# Patient Record
Sex: FEMALE | Race: WHITE | NOT HISPANIC OR LATINO | Employment: OTHER | URBAN - METROPOLITAN AREA
[De-identification: names, ages, dates, MRNs, and addresses within clinical notes are randomized per-mention and may not be internally consistent; named-entity substitution may affect disease eponyms.]

---

## 2022-04-12 ENCOUNTER — HISTORICAL (OUTPATIENT)
Dept: ADMINISTRATIVE | Facility: HOSPITAL | Age: 67
End: 2022-04-12

## 2022-04-30 VITALS
BODY MASS INDEX: 22.85 KG/M2 | WEIGHT: 142.19 LBS | OXYGEN SATURATION: 97 % | SYSTOLIC BLOOD PRESSURE: 133 MMHG | DIASTOLIC BLOOD PRESSURE: 88 MMHG | HEIGHT: 66 IN

## 2024-03-14 DIAGNOSIS — R25.3 FASCICULATIONS: ICD-10-CM

## 2024-03-14 DIAGNOSIS — R51.9 HEADACHE: Primary | ICD-10-CM

## 2024-07-29 ENCOUNTER — OFFICE VISIT (OUTPATIENT)
Dept: NEUROLOGY | Facility: CLINIC | Age: 69
End: 2024-07-29
Payer: MEDICARE

## 2024-07-29 VITALS
HEIGHT: 65 IN | SYSTOLIC BLOOD PRESSURE: 132 MMHG | BODY MASS INDEX: 22.82 KG/M2 | WEIGHT: 137 LBS | DIASTOLIC BLOOD PRESSURE: 72 MMHG

## 2024-07-29 DIAGNOSIS — R51.9 HEADACHE: ICD-10-CM

## 2024-07-29 DIAGNOSIS — R25.3 FASCICULATIONS: ICD-10-CM

## 2024-07-29 DIAGNOSIS — G51.32 HEMIFACIAL SPASM OF LEFT SIDE OF FACE: Primary | ICD-10-CM

## 2024-07-29 PROCEDURE — 99204 OFFICE O/P NEW MOD 45 MIN: CPT | Mod: S$PBB,,, | Performed by: SPECIALIST

## 2024-07-29 PROCEDURE — 99213 OFFICE O/P EST LOW 20 MIN: CPT | Mod: PBBFAC | Performed by: SPECIALIST

## 2024-07-29 PROCEDURE — 99999 PR PBB SHADOW E&M-EST. PATIENT-LVL III: CPT | Mod: PBBFAC,,, | Performed by: SPECIALIST

## 2024-07-29 RX ORDER — MELOXICAM 15 MG/1
15 TABLET ORAL DAILY PRN
COMMUNITY

## 2024-07-29 RX ORDER — GABAPENTIN 300 MG/1
300 CAPSULE ORAL
COMMUNITY

## 2024-07-29 RX ORDER — OMEPRAZOLE 40 MG/1
40 CAPSULE, DELAYED RELEASE ORAL
COMMUNITY
Start: 2024-06-09

## 2024-07-29 RX ORDER — CITALOPRAM 20 MG/1
TABLET, FILM COATED ORAL
COMMUNITY
Start: 2024-05-18

## 2024-07-29 RX ORDER — ATORVASTATIN CALCIUM 20 MG/1
TABLET, FILM COATED ORAL
COMMUNITY

## 2024-07-29 RX ORDER — ALENDRONATE SODIUM 70 MG/1
70 TABLET ORAL
COMMUNITY

## 2024-07-29 NOTE — PROGRESS NOTES
"Subjective:      @Patient ID: Bernie Lee is a 69 y.o. female.    Chief Complaint/referral reason/HPI:   Chief Complaint   Patient presents with    NP ref by Dr Simpson for neuro cons to eval for [place].      C/O Lt eye and mouth spasm at times for abt 5 months. . Lt eye close while she is reading or talking to someone. State she has Ha and had a MRI and was told her Ha are due to her sinue. MRI Head done at [facility] on 03/26/2024       Shaila MATHUR hx comments:  At end of visit asked about safety of gbp bc she takes for intermittent RLE pain     See also 'facesheet' under media for handwritten notes     Review of Systems         Marital status:     [] Working     [] Retired, worked as:   [] Drives     [] Does not drive     -------------------------------------    Cancer     ..  Current Outpatient Medications   Medication Instructions    alendronate (FOSAMAX) 70 mg, Oral, Every 7 days    atorvastatin (LIPITOR) 20 MG tablet Oral    citalopram (CELEXA) 20 MG tablet     gabapentin (NEURONTIN) 300 mg, Oral, As needed (PRN)    meloxicam (MOBIC) 15 mg, Oral, Daily PRN    omeprazole (PRILOSEC) 40 mg, Oral      Objective:      Exam:   Visit Vitals  /72   Ht 5' 5" (1.651 m)   Wt 62.1 kg (137 lb)   BMI 22.80 kg/m²     General Exam  If Accompanied, by__       body habitus_ Body mass index is 22.8 kg/m².  Gen exam   RRR  Neurological:  Exam comments:  L HF spasm  EOM VF ok   Left lip more plump than R   ? 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 [facility] march 2024: Rads summary:see below snippet   My comments: agree brain mri ok       Labs:    [x]  New Patient         []  Multiple Issues/ diagnoses or problems  [if not enumerated in note then discussed but not documented]    Complexity of Data:   [x] High    [] Moderate   [x] Images and reports reviewed [] History obtained from accompaniment  [] Studies ordered [] Studies consid or discussed, " not ordered   [x] Differential Diagnoses discussed       Risks:   [] High     [] Moderate   [] (poss or def) neurodegenerative condition [] () autoimmune condition with possibility of flares or unexpected attack  [] () seiz d.o. with possib of recurr seiz's  [] Cerebrovasc ds with risk of recurrent stroke  [] CNS meds (and/or) potentially high risk non CNS meds taken or discussed which may cause med or behav SE's  [] Fall risk [] Driving discussed  [] Diagnosis unclear or DDx wide making risk uncertain   [x]: botox discussed     microvasc decompression w neurosurg discussed     MDM:    [] High     [x] Moderate       Assessment/Plan:         ICD-10-CM ICD-9-CM   1. Hemifacial spasm of left side of face  G51.32 351.8         Other Comments / Follow Up:          If she decided she ever wants botox she will call us   She requested a 6 mo virtual fu          Fitz Felder MD MARIE [facility], [facility]  [facility] Medical Director   [facility]

## 2025-08-23 ENCOUNTER — PATIENT MESSAGE (OUTPATIENT)
Dept: RESEARCH | Facility: HOSPITAL | Age: 70
End: 2025-08-23
Payer: MEDICARE